# Patient Record
Sex: FEMALE | Race: BLACK OR AFRICAN AMERICAN | URBAN - METROPOLITAN AREA
[De-identification: names, ages, dates, MRNs, and addresses within clinical notes are randomized per-mention and may not be internally consistent; named-entity substitution may affect disease eponyms.]

---

## 2022-06-20 ENCOUNTER — WEB ENCOUNTER (OUTPATIENT)
Dept: URBAN - METROPOLITAN AREA CLINIC 40 | Facility: CLINIC | Age: 47
End: 2022-06-20

## 2022-06-21 ENCOUNTER — WEB ENCOUNTER (OUTPATIENT)
Dept: URBAN - METROPOLITAN AREA CLINIC 40 | Facility: CLINIC | Age: 47
End: 2022-06-21

## 2022-08-23 ENCOUNTER — WEB ENCOUNTER (OUTPATIENT)
Dept: URBAN - METROPOLITAN AREA SURGERY CENTER 30 | Facility: SURGERY CENTER | Age: 47
End: 2022-08-23

## 2022-08-25 ENCOUNTER — WEB ENCOUNTER (OUTPATIENT)
Dept: URBAN - METROPOLITAN AREA SURGERY CENTER 30 | Facility: SURGERY CENTER | Age: 47
End: 2022-08-25

## 2022-08-26 ENCOUNTER — OFFICE VISIT (OUTPATIENT)
Dept: URBAN - METROPOLITAN AREA SURGERY CENTER 30 | Facility: SURGERY CENTER | Age: 47
End: 2022-08-26
Payer: COMMERCIAL

## 2022-08-26 DIAGNOSIS — Z12.11 COLON CANCER SCREENING: ICD-10-CM

## 2022-08-26 PROCEDURE — 45378 DIAGNOSTIC COLONOSCOPY: CPT | Performed by: INTERNAL MEDICINE

## 2022-08-26 PROCEDURE — G8907 PT DOC NO EVENTS ON DISCHARG: HCPCS | Performed by: INTERNAL MEDICINE

## 2024-01-22 PROBLEM — 197321007: Status: ACTIVE | Noted: 2024-01-22

## 2024-01-22 PROBLEM — 129679001: Status: ACTIVE | Noted: 2024-01-22

## 2024-01-23 ENCOUNTER — TELEPHONE ENCOUNTER (OUTPATIENT)
Dept: URBAN - METROPOLITAN AREA CLINIC 74 | Facility: CLINIC | Age: 49
End: 2024-01-23

## 2024-01-23 ENCOUNTER — OFFICE VISIT (OUTPATIENT)
Dept: URBAN - METROPOLITAN AREA CLINIC 74 | Facility: CLINIC | Age: 49
End: 2024-01-23
Payer: COMMERCIAL

## 2024-01-23 ENCOUNTER — LAB OUTSIDE AN ENCOUNTER (OUTPATIENT)
Dept: URBAN - METROPOLITAN AREA CLINIC 74 | Facility: CLINIC | Age: 49
End: 2024-01-23

## 2024-01-23 VITALS
SYSTOLIC BLOOD PRESSURE: 136 MMHG | TEMPERATURE: 98.2 F | BODY MASS INDEX: 33.19 KG/M2 | HEART RATE: 84 BPM | DIASTOLIC BLOOD PRESSURE: 90 MMHG | WEIGHT: 199.2 LBS | HEIGHT: 65 IN

## 2024-01-23 DIAGNOSIS — R93.89 ABNORMAL COMPUTED TOMOGRAPHY ANGIOGRAPHY (CTA): ICD-10-CM

## 2024-01-23 DIAGNOSIS — D25.1 INTRAMURAL UTERINE FIBROID: ICD-10-CM

## 2024-01-23 DIAGNOSIS — K76.0 HEPATIC STEATOSIS: ICD-10-CM

## 2024-01-23 DIAGNOSIS — K92.1 HEMATOCHEZIA: ICD-10-CM

## 2024-01-23 DIAGNOSIS — R19.4 CHANGE IN BOWEL HABITS: ICD-10-CM

## 2024-01-23 DIAGNOSIS — R10.9 ABDOMINAL CRAMPING: ICD-10-CM

## 2024-01-23 PROCEDURE — 99213 OFFICE O/P EST LOW 20 MIN: CPT | Performed by: PHYSICIAN ASSISTANT

## 2024-01-23 RX ORDER — PREDNISONE 20 MG/1
TABLET ORAL
Qty: 5 TABLET | Status: DISCONTINUED | COMMUNITY

## 2024-01-23 RX ORDER — POLYETHYLENE GLYCOL 3350, SODIUM SULFATE, SODIUM CHLORIDE, POTASSIUM CHLORIDE, ASCORBIC ACID, SODIUM ASCORBATE 140-9-5.2G
AS DIRECTED KIT ORAL ONCE
Qty: 1 | Refills: 0 | OUTPATIENT
Start: 2024-01-23 | End: 2024-01-24

## 2024-01-23 RX ORDER — SEMAGLUTIDE 2.68 MG/ML
INJECTION, SOLUTION SUBCUTANEOUS
Qty: 9 UNSPECIFIED | Status: ACTIVE | COMMUNITY

## 2024-01-23 RX ORDER — MONTELUKAST 10 MG/1
TABLET, FILM COATED ORAL
Qty: 90 TABLET | Status: ACTIVE | COMMUNITY

## 2024-01-23 RX ORDER — VALSARTAN 160 MG/1
TABLET, FILM COATED ORAL
Qty: 90 TABLET | Status: ACTIVE | COMMUNITY

## 2024-01-23 RX ORDER — AMOXICILLIN AND CLAVULANATE POTASSIUM 875; 125 MG/1; MG/1
TABLET, FILM COATED ORAL
Qty: 20 TABLET | Status: DISCONTINUED | COMMUNITY

## 2024-01-23 NOTE — HPI-TODAY'S VISIT:
The is a 48-year-old female with past medical history as noted below known to Dr. Nesbitt is presenting to our clinic today with recurrent of GI symptoms. The patient was seen in ED at  on 01/22/2024 with complaint of diarrhea and rectal bleeding.  The patient was advised to follow-up with her primary GI physician for further evaluation and recommendation. The patient is here with her mother to schedule Colonoscopy. The patient sattaes that he has had two episodes of hematochezia since Monday. She descibes bright red blood in toilet and in stools. No rectal pain. She also has some lower abdominal cramps. Last bowel movement was yesterday.  The patient is also on Ozempic for one year for DM and weight loss.    -- The patient denies dyspepsia, dysphagia, odynophagia, hemoptysis, hematemesis, nausea, vomiting, regurgitation, melena, constipation, diarrhea, fever, chills, chest pain, SOB, or any other GI complaints today.  -- The patient denies ETOH, Tobacco, and Illicit drug use.   -- The patient is up to date with Flu and COVID vaccine.  -- The patient admits to take Goody's powder daily.     Diagnostic studies: -- Labs on 01/22/2024 CBC with WBC 8.2, hemoglobin 12.3, hematocrit 36.0, and platelet 397.  BMP with BUN 10, creatinine 0.9, sodium 140, and potassium 3.8.  Lipase 23.  Hepatic function with ALP 96, AST 20, ALT 25, and TB 0.4.  -- CTA on 01/22/ 2024 no site of active gastrointestinal hemorrhage noted.  No acute obstructive or inflammatory changes noted.  Mild heterogeneous enhancing left renal lesion.  Renal cell carcinoma is a possibility.  Left adrenal adenoma.  Large cutaneous fibroid.  Hepatic asteatosis.   Procedures: -- Colonoscopy on 08/26/2022 by Dr. Nesbitt noted the entire examined colon is normal.  No specimen collected.  Repeat colonoscopy in 10 years for surveillance.

## 2024-02-12 ENCOUNTER — OV EP (OUTPATIENT)
Dept: URBAN - METROPOLITAN AREA CLINIC 74 | Facility: CLINIC | Age: 49
End: 2024-02-12
Payer: COMMERCIAL

## 2024-02-12 VITALS
HEIGHT: 65 IN | OXYGEN SATURATION: 98 % | HEART RATE: 77 BPM | DIASTOLIC BLOOD PRESSURE: 74 MMHG | BODY MASS INDEX: 33.52 KG/M2 | WEIGHT: 201.2 LBS | TEMPERATURE: 97 F | SYSTOLIC BLOOD PRESSURE: 108 MMHG

## 2024-02-12 DIAGNOSIS — D25.9 UTERINE LEIOMYOMA, UNSPECIFIED LOCATION: ICD-10-CM

## 2024-02-12 DIAGNOSIS — R19.4 CHANGE IN BOWEL HABITS: ICD-10-CM

## 2024-02-12 DIAGNOSIS — K92.1 HEMATOCHEZIA: ICD-10-CM

## 2024-02-12 DIAGNOSIS — K76.0 HEPATIC STEATOSIS: ICD-10-CM

## 2024-02-12 DIAGNOSIS — R10.9 ABDOMINAL CRAMPING: ICD-10-CM

## 2024-02-12 DIAGNOSIS — R93.89 ABNORMAL COMPUTED TOMOGRAPHY ANGIOGRAPHY (CTA): ICD-10-CM

## 2024-02-12 PROCEDURE — 99213 OFFICE O/P EST LOW 20 MIN: CPT | Performed by: PHYSICIAN ASSISTANT

## 2024-02-12 RX ORDER — SEMAGLUTIDE 2.68 MG/ML
INJECTION, SOLUTION SUBCUTANEOUS
Qty: 9 UNSPECIFIED | Status: ACTIVE | COMMUNITY

## 2024-02-12 RX ORDER — MONTELUKAST 10 MG/1
TABLET, FILM COATED ORAL
Qty: 90 TABLET | Status: ACTIVE | COMMUNITY

## 2024-02-12 RX ORDER — VALSARTAN 160 MG/1
TABLET, FILM COATED ORAL
Qty: 90 TABLET | Status: ACTIVE | COMMUNITY

## 2024-02-12 NOTE — HPI-TODAY'S VISIT:
The is a 48-year-old female with past medical history as noted below known to Dr. Nesbitt is returning to our clinic with sever constipation. She is currently scheduled for EGD and Colonoscopy by Dr. Nesbitt on 02/21/2024. She has seen Urologist Dr. Katelyn Morris and scheduled for MRI with Renal protocol to evaluate mild heterogeneous enhancing left renal lesion. She is here today to discuss about her constipation. She was moving her bowel every 2-3 days and she went to Urgent Care. She was advised to start on Miralax. She started on Miralax on Friday and she is moving her bowels once daily.  Shefeels much better. The patient wants referral to new GYN. MRI is scheduled for 02/26/2024. No other GI iss today.    -- The patient denies dyspepsia, dysphagia, odynophagia, hemoptysis, hematemesis, nausea, vomiting, regurgitation, melena, constipation, diarrhea, fever, chills, chest pain, SOB, or any other GI complaints today.  -- The patient denies ETOH, Tobacco, and Illicit drug use.   -- The patient is up to date with Flu and COVID vaccine.  -- The patient admits to take Goody's powder daily.     Diagnostic studies: -- Labs on 01/22/2024 CBC with WBC 8.2, hemoglobin 12.3, hematocrit 36.0, and platelet 397.  BMP with BUN 10, creatinine 0.9, sodium 140, and potassium 3.8.  Lipase 23.  Hepatic function with ALP 96, AST 20, ALT 25, and TB 0.4.  -- CTA on 01/22/ 2024 no site of active gastrointestinal hemorrhage noted.  No acute obstructive or inflammatory changes noted.  Mild heterogeneous enhancing left renal lesion.  Renal cell carcinoma is a possibility.  Left adrenal adenoma.  Large cutaneous fibroid.  Hepatic asteatosis.   Procedures: -- Colonoscopy on 08/26/2022 by Dr. Nesbitt noted the entire examined colon is normal.  No specimen collected.  Repeat colonoscopy in 10 years for surveillance.

## 2024-02-21 ENCOUNTER — COL/EGD (OUTPATIENT)
Dept: URBAN - METROPOLITAN AREA SURGERY CENTER 30 | Facility: SURGERY CENTER | Age: 49
End: 2024-02-21
Payer: COMMERCIAL

## 2024-02-21 ENCOUNTER — LAB (OUTPATIENT)
Dept: URBAN - METROPOLITAN AREA CLINIC 4 | Facility: CLINIC | Age: 49
End: 2024-02-21
Payer: COMMERCIAL

## 2024-02-21 DIAGNOSIS — R19.7 ACUTE DIARRHEA: ICD-10-CM

## 2024-02-21 DIAGNOSIS — K62.5 ANAL BLEEDING: ICD-10-CM

## 2024-02-21 DIAGNOSIS — K21.9 ACID REFLUX: ICD-10-CM

## 2024-02-21 DIAGNOSIS — K31.89 OTHER DISEASES OF STOMACH AND DUODENUM: ICD-10-CM

## 2024-02-21 DIAGNOSIS — R10.13 ABDOMINAL DISCOMFORT, EPIGASTRIC: ICD-10-CM

## 2024-02-21 PROCEDURE — 45378 DIAGNOSTIC COLONOSCOPY: CPT | Performed by: INTERNAL MEDICINE

## 2024-02-21 PROCEDURE — 88312 SPECIAL STAINS GROUP 1: CPT | Performed by: PATHOLOGY

## 2024-02-21 PROCEDURE — 43239 EGD BIOPSY SINGLE/MULTIPLE: CPT | Performed by: INTERNAL MEDICINE

## 2024-02-21 PROCEDURE — 88305 TISSUE EXAM BY PATHOLOGIST: CPT | Performed by: PATHOLOGY

## 2024-02-21 PROCEDURE — 45380 COLONOSCOPY AND BIOPSY: CPT | Performed by: INTERNAL MEDICINE

## 2024-02-21 RX ORDER — MONTELUKAST 10 MG/1
TABLET, FILM COATED ORAL
Qty: 90 TABLET | Status: ACTIVE | COMMUNITY

## 2024-02-21 RX ORDER — SEMAGLUTIDE 2.68 MG/ML
INJECTION, SOLUTION SUBCUTANEOUS
Qty: 9 UNSPECIFIED | Status: ACTIVE | COMMUNITY

## 2024-02-21 RX ORDER — VALSARTAN 160 MG/1
TABLET, FILM COATED ORAL
Qty: 90 TABLET | Status: ACTIVE | COMMUNITY

## 2024-03-05 ENCOUNTER — OV EP (OUTPATIENT)
Dept: URBAN - METROPOLITAN AREA CLINIC 74 | Facility: CLINIC | Age: 49
End: 2024-03-05

## 2024-03-18 PROBLEM — 82934008: Status: ACTIVE | Noted: 2024-03-18

## 2024-03-25 ENCOUNTER — OV EP (OUTPATIENT)
Dept: URBAN - METROPOLITAN AREA CLINIC 74 | Facility: CLINIC | Age: 49
End: 2024-03-25

## 2024-03-25 RX ORDER — SEMAGLUTIDE 2.68 MG/ML
INJECTION, SOLUTION SUBCUTANEOUS
Qty: 9 UNSPECIFIED | Status: ACTIVE | COMMUNITY

## 2024-03-25 RX ORDER — VALSARTAN 160 MG/1
TABLET, FILM COATED ORAL
Qty: 90 TABLET | Status: ACTIVE | COMMUNITY

## 2024-03-25 RX ORDER — MONTELUKAST 10 MG/1
TABLET, FILM COATED ORAL
Qty: 90 TABLET | Status: ACTIVE | COMMUNITY

## 2024-04-08 ENCOUNTER — OV EP (OUTPATIENT)
Dept: URBAN - METROPOLITAN AREA CLINIC 74 | Facility: CLINIC | Age: 49
End: 2024-04-08

## 2024-04-08 ENCOUNTER — OV EP (OUTPATIENT)
Dept: URBAN - METROPOLITAN AREA CLINIC 74 | Facility: CLINIC | Age: 49
End: 2024-04-08
Payer: COMMERCIAL

## 2024-04-08 VITALS
WEIGHT: 200 LBS | TEMPERATURE: 98.6 F | BODY MASS INDEX: 34.15 KG/M2 | HEIGHT: 64 IN | SYSTOLIC BLOOD PRESSURE: 130 MMHG | DIASTOLIC BLOOD PRESSURE: 82 MMHG | HEART RATE: 97 BPM

## 2024-04-08 DIAGNOSIS — K59.09 CHRONIC CONSTIPATION: ICD-10-CM

## 2024-04-08 DIAGNOSIS — N60.01 BREAST CYST, RIGHT: ICD-10-CM

## 2024-04-08 DIAGNOSIS — Z80.0 FAMILY HISTORY OF COLON CANCER: ICD-10-CM

## 2024-04-08 DIAGNOSIS — K76.0 HEPATIC STEATOSIS: ICD-10-CM

## 2024-04-08 DIAGNOSIS — N28.89 LEFT RENAL MASS: ICD-10-CM

## 2024-04-08 PROBLEM — 309088003: Status: ACTIVE | Noted: 2024-04-08

## 2024-04-08 PROCEDURE — 99213 OFFICE O/P EST LOW 20 MIN: CPT | Performed by: PHYSICIAN ASSISTANT

## 2024-04-08 RX ORDER — MONTELUKAST 10 MG/1
TABLET, FILM COATED ORAL
Qty: 90 TABLET | Status: ACTIVE | COMMUNITY

## 2024-04-08 RX ORDER — VALSARTAN 160 MG/1
TABLET, FILM COATED ORAL
Qty: 90 TABLET | Status: ACTIVE | COMMUNITY

## 2024-04-08 RX ORDER — SEMAGLUTIDE 2.68 MG/ML
INJECTION, SOLUTION SUBCUTANEOUS
Qty: 9 UNSPECIFIED | Status: ACTIVE | COMMUNITY

## 2024-04-08 NOTE — HPI-TODAY'S VISIT:
The is a 48-year-old female with past medical history as noted below known to Dr. Nesbitt is returning to our clinic today to discuss her EGD and Colonoscopy results. She continues on Linzess 145 mcg once daily.    -- The patient denies dyspepsia, dysphagia, odynophagia, hemoptysis, hematemesis, nausea, vomiting, regurgitation, melena, constipation, diarrhea, fever, chills, chest pain, SOB, or any other GI complaints today.  -- The patient denies ETOH, Tobacco, and Illicit drug use.   -- The patient is up to date with Flu and COVID vaccine.  -- The patient admits to take Goody's powder daily.     Diagnostic studies: -- MRI of abdomen on 02/24/2024 with complex cystic lesion at the left kidney superior pole with thickened internal septations (Bosniak 3). Hepatic steatosis. Enhancing 8 mm nodule in the right breast. Consider further evaluation with dedicated mammography, as indicated.  -- Labs on 01/22/2024 CBC with WBC 8.2, hemoglobin 12.3, hematocrit 36.0, and platelet 397.  BMP with BUN 10, creatinine 0.9, sodium 140, and potassium 3.8.  Lipase 23.  Hepatic function with ALP 96, AST 20, ALT 25, and TB 0.4.  -- CTA on 01/22/ 2024 no site of active gastrointestinal hemorrhage noted.  No acute obstructive or inflammatory changes noted.  Mild heterogeneous enhancing left renal lesion.  Renal cell carcinoma is a possibility.  Left adrenal adenoma.  Large cutaneous fibroid.  Hepatic asteatosis.   Procedures: -- EGD with biopsy on 02/21/2024 by Dr. Nesbtit noted normal esophagus.  Normal stomach.  Normal examined duodenum.  Biopsy of the stomach with no significant abnormality.  Esophagus with reflux type changes.  No Fernandes's esophagus or eosinophilic esophagitis.  -- Colonoscopy on 02/21/2024 by Dr. Nesbitt noted the entire examined colon is normal. No specimen collected.  Repeat colonoscopy in 10 years for surveillance.

## 2024-04-08 NOTE — HPI-TODAY'S VISIT:
The is a 48-year-old female with past medical history as noted below known to Dr. Nesbitt is returning to our clinic today to discuss her EGD and Colonoscopy results. She has not used  Linzess 145 mcg since she has changed her diet with more fibers, she moves her bowel daily. She is following with [facility] Dr. Javed Meza and she is scheduled on 04/26/2024 for Left partial Nephrectomy with known history of  2.9 cm LUP mildly enhancing bosniak 3 renal lesion, and 2.4 cm L adrenal adenoma. She is scheduled for Mammogram in 10/2024. She is scheduled to see GYN Dr. Lamberto Paula.     -- The patient denies dyspepsia, dysphagia, odynophagia, hemoptysis, hematemesis, nausea, vomiting, regurgitation, melena, constipation, diarrhea, fever, chills, chest pain, SOB, or any other GI complaints today.  -- The patient denies ETOH, Tobacco, and Illicit drug use.   -- The patient is up to date with Flu and COVID vaccine.  -- The patient admits to take Goody's powder daily. Stopped since her EGD.     Diagnostic studies: -- MRI of abdomen on 02/24/2024 with complex cystic lesion at the left kidney superior pole with thickened internal septations (Bosniak 3). Hepatic steatosis. Enhancing 8 mm nodule in the right breast. Consider further evaluation with dedicated mammography, as indicated.  -- Labs on 01/22/2024 CBC with WBC 8.2, hemoglobin 12.3, hematocrit 36.0, and platelet 397.  BMP with BUN 10, creatinine 0.9, sodium 140, and potassium 3.8.  Lipase 23.  Hepatic function with ALP 96, AST 20, ALT 25, and TB 0.4.  -- CTA on 01/22/ 2024 no site of active gastrointestinal hemorrhage noted.  No acute obstructive or inflammatory changes noted.  Mild heterogeneous enhancing left renal lesion.  Renal cell carcinoma is a possibility.  Left adrenal adenoma.  Large cutaneous fibroid.  Hepatic asteatosis.   Procedures: -- EGD with biopsy on 02/21/2024 by Dr. Nesbitt noted normal esophagus.  Normal stomach.  Normal examined duodenum.  Biopsy of the stomach with no significant abnormality.  Esophagus with reflux type changes.  No Fernandes's esophagus or eosinophilic esophagitis.  -- Colonoscopy on 02/21/2024 by Dr. Nesbitt noted the entire examined colon is normal. No specimen collected.  Repeat colonoscopy in 5 years for surveillance.

## 2024-04-30 ENCOUNTER — OV EP (OUTPATIENT)
Dept: URBAN - METROPOLITAN AREA CLINIC 17 | Facility: CLINIC | Age: 49
End: 2024-04-30

## 2024-08-08 NOTE — HPI-TODAY'S VISIT:
The is a 48-year-old female with past medical history as noted below known to Dr. Nesbitt is returning to our clinic today to discuss her EGD and Colonoscopy results. She continues on Linzess 145 mcg once daily.    -- The patient denies dyspepsia, dysphagia, odynophagia, hemoptysis, hematemesis, nausea, vomiting, regurgitation, melena, constipation, diarrhea, fever, chills, chest pain, SOB, or any other GI complaints today.  -- The patient denies ETOH, Tobacco, and Illicit drug use.   -- The patient is up to date with Flu and COVID vaccine.  -- The patient admits to take Goody's powder daily.     Diagnostic studies: -- MRI of abdomen on 02/24/2024 with complex cystic lesion at the left kidney superior pole with thickened internal septations (Bosniak 3). Hepatic steatosis. Enhancing 8 mm nodule in the right breast. Consider further evaluation with dedicated mammography, as indicated.  -- Labs on 01/22/2024 CBC with WBC 8.2, hemoglobin 12.3, hematocrit 36.0, and platelet 397.  BMP with BUN 10, creatinine 0.9, sodium 140, and potassium 3.8.  Lipase 23.  Hepatic function with ALP 96, AST 20, ALT 25, and TB 0.4.  -- CTA on 01/22/ 2024 no site of active gastrointestinal hemorrhage noted.  No acute obstructive or inflammatory changes noted.  Mild heterogeneous enhancing left renal lesion.  Renal cell carcinoma is a possibility.  Left adrenal adenoma.  Large cutaneous fibroid.  Hepatic asteatosis.   Procedures: -- EGD with biopsy on 02/21/2024 by Dr. Nesbitt noted normal esophagus.  Normal stomach.  Normal examined duodenum.  Biopsy of the stomach with no significant abnormality.  Esophagus with reflux type changes.  No Fernandes's esophagus or eosinophilic esophagitis.  -- Colonoscopy on 02/21/2024 by Dr. Nesbitt noted the entire examined colon is normal. No specimen collected.  Repeat colonoscopy in 10 years for surveillance. Improved

## 2024-09-23 ENCOUNTER — WEB ENCOUNTER (OUTPATIENT)
Dept: URBAN - METROPOLITAN AREA CLINIC 74 | Facility: CLINIC | Age: 49
End: 2024-09-23

## 2024-10-14 ENCOUNTER — OFFICE VISIT (OUTPATIENT)
Dept: URBAN - METROPOLITAN AREA CLINIC 74 | Facility: CLINIC | Age: 49
End: 2024-10-14

## 2024-10-18 ENCOUNTER — DASHBOARD ENCOUNTERS (OUTPATIENT)
Age: 49
End: 2024-10-18

## 2024-10-21 ENCOUNTER — OFFICE VISIT (OUTPATIENT)
Dept: URBAN - METROPOLITAN AREA CLINIC 74 | Facility: CLINIC | Age: 49
End: 2024-10-21
Payer: COMMERCIAL

## 2024-10-21 ENCOUNTER — OFFICE VISIT (OUTPATIENT)
Dept: URBAN - METROPOLITAN AREA CLINIC 74 | Facility: CLINIC | Age: 49
End: 2024-10-21

## 2024-10-21 VITALS
HEIGHT: 64 IN | HEART RATE: 92 BPM | OXYGEN SATURATION: 98 % | BODY MASS INDEX: 35.78 KG/M2 | WEIGHT: 209.6 LBS | DIASTOLIC BLOOD PRESSURE: 84 MMHG | TEMPERATURE: 98.2 F | SYSTOLIC BLOOD PRESSURE: 130 MMHG

## 2024-10-21 DIAGNOSIS — K59.04 CHRONIC IDIOPATHIC CONSTIPATION: ICD-10-CM

## 2024-10-21 DIAGNOSIS — K76.0 HEPATIC STEATOSIS: ICD-10-CM

## 2024-10-21 PROCEDURE — 99213 OFFICE O/P EST LOW 20 MIN: CPT | Performed by: PHYSICIAN ASSISTANT

## 2024-10-21 RX ORDER — SEMAGLUTIDE 2.68 MG/ML
INJECTION, SOLUTION SUBCUTANEOUS
Qty: 9 UNSPECIFIED | Status: ON HOLD | COMMUNITY

## 2024-10-21 RX ORDER — VALSARTAN 160 MG/1
TABLET, FILM COATED ORAL
Qty: 90 TABLET | Status: ACTIVE | COMMUNITY

## 2024-10-21 RX ORDER — MONTELUKAST 10 MG/1
TABLET, FILM COATED ORAL
Qty: 90 TABLET | Status: ACTIVE | COMMUNITY

## 2024-10-21 RX ORDER — MONTELUKAST 10 MG/1
TABLET, FILM COATED ORAL
Qty: 90 TABLET | COMMUNITY

## 2024-10-21 RX ORDER — SEMAGLUTIDE 2.68 MG/ML
INJECTION, SOLUTION SUBCUTANEOUS
Qty: 9 UNSPECIFIED | COMMUNITY

## 2024-10-21 RX ORDER — VALSARTAN 160 MG/1
TABLET, FILM COATED ORAL
Qty: 90 TABLET | COMMUNITY

## 2024-10-21 NOTE — HPI-TODAY'S VISIT:
The is a 48-year-old female with past medical history as noted below known to Dr. Nesbitt is presenting to our clinic today for follow up. She continues on Linzess 145 mcg once daily for constipation. S/P Robotic assisted left partial nephrectomy by Dr. Javed Meza on 04/26/2024.    Diagnostic studies: -- MRI of abdomen on 02/24/2024 with complex cystic lesion at the left kidney superior pole with thickened internal septations (Bosniak 3). Hepatic steatosis. Enhancing 8 mm nodule in the right breast. Consider further evaluation with dedicated mammography, as indicated.  -- Labs on 01/22/2024 CBC with WBC 8.2, hemoglobin 12.3, hematocrit 36.0, and platelet 397.  BMP with BUN 10, creatinine 0.9, sodium 140, and potassium 3.8.  Lipase 23.  Hepatic function with ALP 96, AST 20, ALT 25, and TB 0.4.  -- CTA on 01/22/ 2024 no site of active gastrointestinal hemorrhage noted.  No acute obstructive or inflammatory changes noted.  Mild heterogeneous enhancing left renal lesion.  Renal cell carcinoma is a possibility.  Left adrenal adenoma.  Large cutaneous fibroid.  Hepatic asteatosis.   Procedures: -- EGD with biopsy on 02/21/2024 by Dr. Nesbitt noted normal esophagus.  Normal stomach.  Normal examined duodenum.  Biopsy of the stomach with no significant abnormality.  Esophagus with reflux type changes.  No Fernandes's esophagus or eosinophilic esophagitis.  -- Colonoscopy on 02/21/2024 by Dr. Nesbitt noted the entire examined colon is normal. No specimen collected.  Repeat colonoscopy in 10 years for surveillance.

## 2024-10-21 NOTE — HPI-TODAY'S VISIT:
The is a 48-year-old female with past medical history as noted below known to Dr. Nesbitt is presenting to our clinic today for follow up. New diagnosis of Renal Cell Carcinoma.  S/P Robotic assisted left partial nephrectomy by Dr. Javed Meza on 04/26/2024.  Abnormal right breast noudle on CT/MRI in 02/2024. She had Mammogram on 06/24/2024 with unremarakble result. She is doing much better since last visit. She is following with her Urologist Dr. Meza. Constipation has resolved on Linzess 145 mcg as needed. She is also on VH pre-probiotic 1-2 capsules daily. The patient is up todate with Flu, COVID, and Hepatitis B vaccination. No ETOH or Tobacco.    Diagnostic studies: -- Labs on 09/21/2024 CMP with BUN 7, creatinine 0.9,  9, AST 21, ALT 26, and TB 0.3.  CBC with WBC 6.6, hemoglobin 11.3, hematocrit 36.6, and platelet 351.  Lipid panel with cholesterol 198, triglyceride 128, HDL 41, and .  Hemoglobin A1c 6.3.  -- MRI of abdomen on 02/24/2024 with complex cystic lesion at the left kidney superior pole with thickened internal septations (Bosniak 3). Hepatic steatosis. Enhancing 8 mm nodule in the right breast. Consider further evaluation with dedicated mammography, as indicated.  -- Labs on 01/22/2024 CBC with WBC 8.2, hemoglobin 12.3, hematocrit 36.0, and platelet 397. BMP with BUN 10, creatinine 0.9, sodium 140, and potassium 3.8. Lipase 23. Hepatic function with ALP 96, AST 20, ALT 25, and TB 0.4.  -- CTA on 01/22/ 2024 no site of active gastrointestinal hemorrhage noted. No acute obstructive or inflammatory changes noted. Mild heterogeneous enhancing left renal lesion. Renal cell carcinoma is a possibility. Left adrenal adenoma. Large cutaneous fibroid. Hepatic asteatosis.   Procedures: -- EGD with biopsy on 02/21/2024 by Dr. Nesbitt noted normal esophagus. Normal stomach. Normal examined duodenum. Biopsy of the stomach with no significant abnormality. Esophagus with reflux type changes. No Fernandes's esophagus or eosinophilic esophagitis.  -- Colonoscopy on 02/21/2024 by Dr. Nesbitt noted the entire examined colon is normal. No specimen collected. Repeat colonoscopy in 10 years for surveillance.

## 2024-11-15 ENCOUNTER — WEB ENCOUNTER (OUTPATIENT)
Dept: URBAN - METROPOLITAN AREA CLINIC 74 | Facility: CLINIC | Age: 49
End: 2024-11-15

## 2025-05-02 ENCOUNTER — OFFICE VISIT (OUTPATIENT)
Dept: URBAN - METROPOLITAN AREA CLINIC 74 | Facility: CLINIC | Age: 50
End: 2025-05-02
Payer: COMMERCIAL

## 2025-05-02 DIAGNOSIS — D50.0 IRON DEFICIENCY ANEMIA DUE TO CHRONIC BLOOD LOSS: ICD-10-CM

## 2025-05-02 DIAGNOSIS — K59.04 CHRONIC IDIOPATHIC CONSTIPATION: ICD-10-CM

## 2025-05-02 DIAGNOSIS — N92.1 MENOMETRORRHAGIA: ICD-10-CM

## 2025-05-02 DIAGNOSIS — K76.0 HEPATIC STEATOSIS: ICD-10-CM

## 2025-05-02 DIAGNOSIS — D25.1 INTRAMURAL LEIOMYOMA OF UTERUS: ICD-10-CM

## 2025-05-02 PROBLEM — 724556004: Status: ACTIVE | Noted: 2025-05-02

## 2025-05-02 PROBLEM — 314631008: Status: ACTIVE | Noted: 2025-05-02

## 2025-05-02 PROCEDURE — 99214 OFFICE O/P EST MOD 30 MIN: CPT | Performed by: PHYSICIAN ASSISTANT

## 2025-05-02 RX ORDER — VALSARTAN 160 MG/1
TABLET, FILM COATED ORAL
Qty: 90 TABLET | Status: ACTIVE | COMMUNITY

## 2025-05-02 RX ORDER — MONTELUKAST 10 MG/1
TABLET, FILM COATED ORAL
Qty: 90 TABLET | Status: ACTIVE | COMMUNITY

## 2025-05-02 NOTE — HPI-TODAY'S VISIT:
The is a 49-year-old female with past medical history as noted below known to Dr. Nesbitt is presenting to our clinic today for follow up appointment with alternating bowel habits. She continues on Linzess 145 mcg once daily for constipation only as needed. S/P Robotic assisted left partial nephrectomy by Dr. Javed Meza on 04/26/2024. She also started on Probiotic daily with more frequent bowel habits. No other GI issues today. Recent labs and imaging with her Urology Dr. Javed Meza and PCP Dr. Jayant Morris.   Diagnostic studies: -- CT scan of the abdomen and pelvis on 04/29/2024 noted partial left nephrectomy without evidence of local recurrence or abdominal metastasis.  Left adrenal adenoma.  Leiomyomatosis uterus.  Mild fat deposition in the liver.  Hepatomegaly.  Increased number but not increased size of lymph nodes in the peritoneum unchanged from the last imaging.  The pancreas, spleen, right adrenal are without suspicious of focal lesion.  -- Labs on 04/21/2025 CBC with WBC 7.3, hemoglobin 10.7, hematocrit 32.8, and platelets 365.  CMP with BUN 11, creatinine 0.8, ALP 93, AST 21, ALT 23, and TB 0.2.  Lipid panel with cholesterol 162, triglyceride 108, HDL 34, and .  Hepatitis B surface antibody 10 consistent with immunity. FIB-4 INDEX 0.56 at low risk.   -- MRI of abdomen on 02/24/2024 with complex cystic lesion at the left kidney superior pole with thickened internal septations (Bosniak 3). Hepatic steatosis. Enhancing 8 mm nodule in the right breast. Consider further evaluation with dedicated mammography, as indicated.  Procedures: -- EGD with biopsy on 02/21/2024 by Dr. Nesbitt noted normal esophagus.  Normal stomach.  Normal examined duodenum.  Biopsy of the stomach with no significant abnormality.  Esophagus with reflux type changes.  No Fernandes's esophagus or eosinophilic esophagitis.  -- Colonoscopy on 02/21/2024 by Dr. Nesbitt noted the entire examined colon is normal. No specimen collected.  Repeat colonoscopy in 10 years for surveillance.

## 2025-05-02 NOTE — PHYSICAL EXAM CARDIOVASCULAR:
no edema, no murmurs, regular rate and rhythm Garett: I performed a face to face bedside interview with patient regarding history of present illness, review of symptoms and past medical history. I completed an independent physical exam and ordered tests/medications as needed.  I have discussed patient's plan of care with advanced care provider. The advanced care provider assisted in  executing the discussed plan. I was available for any questions or issues that may have arose during the execution of the plan of care.